# Patient Record
Sex: MALE | Race: ASIAN | NOT HISPANIC OR LATINO | URBAN - METROPOLITAN AREA
[De-identification: names, ages, dates, MRNs, and addresses within clinical notes are randomized per-mention and may not be internally consistent; named-entity substitution may affect disease eponyms.]

---

## 2022-08-04 ENCOUNTER — EMERGENCY (EMERGENCY)
Facility: HOSPITAL | Age: 27
LOS: 1 days | Discharge: HOME | End: 2022-08-04
Attending: STUDENT IN AN ORGANIZED HEALTH CARE EDUCATION/TRAINING PROGRAM | Admitting: STUDENT IN AN ORGANIZED HEALTH CARE EDUCATION/TRAINING PROGRAM

## 2022-08-04 VITALS
RESPIRATION RATE: 16 BRPM | SYSTOLIC BLOOD PRESSURE: 115 MMHG | HEART RATE: 104 BPM | DIASTOLIC BLOOD PRESSURE: 66 MMHG | OXYGEN SATURATION: 100 % | TEMPERATURE: 97 F

## 2022-08-04 PROCEDURE — 99283 EMERGENCY DEPT VISIT LOW MDM: CPT

## 2022-08-04 NOTE — ED PROVIDER NOTE - NSFOLLOWUPINSTRUCTIONS_ED_ALL_ED_FT
Please follow-up with your primary care physician and neurology  Anti-inflammatory medications like motrin/advil/aleve as needed for pain  Avoid excessive screen time as possible  Return to ED if you experience worsening symptoms, weakness/numbness/tingling, changes in vision, changes in level of consciousness, or anything else concerning to you.

## 2022-08-04 NOTE — ED PROVIDER NOTE - CLINICAL SUMMARY MEDICAL DECISION MAKING FREE TEXT BOX
26M p/w 2months intermittent headache involving neck/posterior head without neuro deficits per report or on exam, no visual changes, no trauma/falls, no family history of headache syndromes or acute neuro events. Likely tension type vs migraine. Currently headache free. No interventions required at this time. Neuro follow-up info provided.

## 2022-08-04 NOTE — ED ADULT TRIAGE NOTE - CHIEF COMPLAINT QUOTE
Pt c/o headache to back of head x few months, comes and goes. Denies trauma to area. Denies blurry vision, dizziness. +photophobia. denies pmhx.

## 2022-08-04 NOTE — ED PROVIDER NOTE - OBJECTIVE STATEMENT
26M no PMH p/w intermittent headache [id number]. 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 Lately has noted headahces more frequent and feels similar neck pain upon awakening that self resolves after a few minutes. Taking tylenol at home with relief. No head trauma/LOC. No associated weakness/numbness/tingling. No photophobia. No fever/chills. No travels/sick contacts.

## 2022-08-04 NOTE — ED PROVIDER NOTE - PATIENT PORTAL LINK FT
You can access the FollowMyHealth Patient Portal offered by [facility] by registering at the following website: [email or web address]. By joining [facility]’s FollowMyHealth portal, you will also be able to view your health information using other applications (apps) compatible with our system.